# Patient Record
Sex: MALE | Race: WHITE | NOT HISPANIC OR LATINO | Employment: OTHER | URBAN - NONMETROPOLITAN AREA
[De-identification: names, ages, dates, MRNs, and addresses within clinical notes are randomized per-mention and may not be internally consistent; named-entity substitution may affect disease eponyms.]

---

## 2023-10-18 ENCOUNTER — OFFICE VISIT (OUTPATIENT)
Dept: BEHAVIORAL HEALTH | Facility: CLINIC | Age: 23
End: 2023-10-18
Payer: COMMERCIAL

## 2023-10-18 VITALS
OXYGEN SATURATION: 96 % | WEIGHT: 154 LBS | HEIGHT: 76 IN | HEART RATE: 74 BPM | BODY MASS INDEX: 18.75 KG/M2 | SYSTOLIC BLOOD PRESSURE: 120 MMHG | DIASTOLIC BLOOD PRESSURE: 64 MMHG

## 2023-10-18 DIAGNOSIS — F43.10 POST TRAUMATIC STRESS DISORDER (PTSD): ICD-10-CM

## 2023-10-18 DIAGNOSIS — F33.3 SEVERE EPISODE OF RECURRENT MAJOR DEPRESSIVE DISORDER, WITH PSYCHOTIC FEATURES: Primary | ICD-10-CM

## 2023-10-18 DIAGNOSIS — F81.9 LEARNING DISABILITY: ICD-10-CM

## 2023-10-18 DIAGNOSIS — R62.50 DEVELOPMENTAL DELAY: ICD-10-CM

## 2023-10-18 DIAGNOSIS — G47.20 CIRCADIAN RHYTHM SLEEP DISORDER, UNSPECIFIED TYPE: ICD-10-CM

## 2023-10-18 DIAGNOSIS — F41.1 GENERALIZED ANXIETY DISORDER: ICD-10-CM

## 2023-10-18 RX ORDER — HYDROXYZINE PAMOATE 25 MG/1
25 CAPSULE ORAL 2 TIMES DAILY PRN
Qty: 60 CAPSULE | Refills: 0 | Status: SHIPPED | OUTPATIENT
Start: 2023-10-18

## 2023-10-18 RX ORDER — VENLAFAXINE HYDROCHLORIDE 37.5 MG/1
CAPSULE, EXTENDED RELEASE ORAL
Qty: 60 CAPSULE | Refills: 0 | Status: SHIPPED | OUTPATIENT
Start: 2023-10-18
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
## 2023-10-31 ENCOUNTER — TELEPHONE (OUTPATIENT)
Dept: BEHAVIORAL HEALTH | Facility: CLINIC | Age: 23
End: 2023-10-31
Payer: COMMERCIAL

## 2023-10-31 NOTE — TELEPHONE ENCOUNTER
PATIENTS MOM CALLED IN REGARDS TO A LETTER SHE SAID YOU WAS GOING TO TYPE OUT FOR HER, STATING THAT THEIR DOG, AGUILA, IS HIS EMOTIONAL SUPPORT ANIMAL. SO THAT THEY CAN HAVE THE DOG AT THEIR APARTMENT.

## 2023-10-31 NOTE — TELEPHONE ENCOUNTER
Follow up with PCP for long term management    Take diclofenac twice a day as needed for pain and inflammation    You may utilize baclofen to return today as needed for neck stiffness, this may make you sleepy do not operate machinery or drive taking this medication.    Returns to the emergency department for worsening symptoms   Letter created and routed to you for printing when patient is able to .

## 2023-11-17 DIAGNOSIS — F33.3 SEVERE EPISODE OF RECURRENT MAJOR DEPRESSIVE DISORDER, WITH PSYCHOTIC FEATURES: ICD-10-CM

## 2023-11-17 DIAGNOSIS — F41.1 GENERALIZED ANXIETY DISORDER: ICD-10-CM

## 2023-11-17 RX ORDER — VENLAFAXINE HYDROCHLORIDE 37.5 MG/1
CAPSULE, EXTENDED RELEASE ORAL
Qty: 60 CAPSULE | Refills: 0 | OUTPATIENT
Start: 2023-11-17

## 2023-11-20 ENCOUNTER — TELEPHONE (OUTPATIENT)
Dept: BEHAVIORAL HEALTH | Facility: CLINIC | Age: 23
End: 2023-11-20

## 2023-11-20 DIAGNOSIS — F33.3 SEVERE EPISODE OF RECURRENT MAJOR DEPRESSIVE DISORDER, WITH PSYCHOTIC FEATURES: ICD-10-CM

## 2023-11-20 DIAGNOSIS — F41.1 GENERALIZED ANXIETY DISORDER: ICD-10-CM

## 2023-11-20 DIAGNOSIS — G47.20 CIRCADIAN RHYTHM SLEEP DISORDER, UNSPECIFIED TYPE: ICD-10-CM

## 2023-11-20 NOTE — TELEPHONE ENCOUNTER
Incoming Refill Request      Medication requested (name and dose):     HYDROXYZINE PAMOATE (VISTARIL) 25 MG CAPSULE     VENLAFAXINE XR (EFFEXOR-XR) 37.5 MG CAPSULE     Pharmacy where request should be sent:     [facility]     Additional details provided by patient:     PATIENT HAS BEEN RESCHEDULED TO 01/16/24 DUE TO PROVIDER BEING OUT OF THE OFFICE     Best call back number: [phone]    Does the patient have less than a 3 day supply:  [] Yes  [] No    Deacon Ricci Rep  11/20/23, 08:55 EST        {Tip  DIRECTIONS Send the encounter HIGH priority, If patient has less than a 3 day supply. If the patient will run out of medication over the weekend add that information to the additional details line. Send this encounter to the clinical pool:[id number]

## 2023-11-21 RX ORDER — HYDROXYZINE PAMOATE 25 MG/1
25 CAPSULE ORAL 2 TIMES DAILY PRN
Qty: 60 CAPSULE | Refills: 1 | Status: SHIPPED | OUTPATIENT
Start: 2023-11-21

## 2023-11-21 RX ORDER — VENLAFAXINE HYDROCHLORIDE 37.5 MG/1
CAPSULE, EXTENDED RELEASE ORAL
Qty: 60 CAPSULE | Refills: 1 | Status: SHIPPED | OUTPATIENT
Start: 2023-11-21

## 2024-01-16 ENCOUNTER — OFFICE VISIT (OUTPATIENT)
Dept: BEHAVIORAL HEALTH | Facility: CLINIC | Age: 24
End: 2024-01-16
Payer: COMMERCIAL

## 2024-01-16 VITALS
WEIGHT: 163 LBS | OXYGEN SATURATION: 97 % | HEIGHT: 76 IN | BODY MASS INDEX: 19.85 KG/M2 | SYSTOLIC BLOOD PRESSURE: 110 MMHG | DIASTOLIC BLOOD PRESSURE: 70 MMHG | HEART RATE: 78 BPM

## 2024-01-16 DIAGNOSIS — G47.20 CIRCADIAN RHYTHM SLEEP DISORDER, UNSPECIFIED TYPE: ICD-10-CM

## 2024-01-16 DIAGNOSIS — F81.9 LEARNING DISABILITY: ICD-10-CM

## 2024-01-16 DIAGNOSIS — F43.10 POST TRAUMATIC STRESS DISORDER (PTSD): ICD-10-CM

## 2024-01-16 DIAGNOSIS — R62.50 DEVELOPMENTAL DELAY: ICD-10-CM

## 2024-01-16 DIAGNOSIS — F25.1 SCHIZOAFFECTIVE DISORDER, DEPRESSIVE TYPE: Primary | ICD-10-CM

## 2024-01-16 DIAGNOSIS — F41.1 GENERALIZED ANXIETY DISORDER: ICD-10-CM

## 2024-01-16 RX ORDER — ZIPRASIDONE HYDROCHLORIDE 20 MG/1
CAPSULE ORAL
Qty: 30 CAPSULE | Refills: 2 | Status: SHIPPED | OUTPATIENT
Start: 2024-01-16

## 2024-01-16 RX ORDER — HYDROXYZINE PAMOATE 50 MG/1
CAPSULE ORAL
Qty: 30 CAPSULE | Refills: 2 | Status: SHIPPED | OUTPATIENT
Start: 2024-01-16

## 2024-01-16 RX ORDER — VENLAFAXINE HYDROCHLORIDE 75 MG/1
75 CAPSULE, EXTENDED RELEASE ORAL DAILY
Qty: 30 CAPSULE | Refills: 2 | Status: SHIPPED | OUTPATIENT
Start: 2024-01-16
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
## 2024-02-25 DIAGNOSIS — F41.1 GENERALIZED ANXIETY DISORDER: ICD-10-CM

## 2024-02-25 DIAGNOSIS — G47.20 CIRCADIAN RHYTHM SLEEP DISORDER, UNSPECIFIED TYPE: ICD-10-CM

## 2024-02-26 RX ORDER — HYDROXYZINE PAMOATE 50 MG/1
CAPSULE ORAL
Qty: 30 CAPSULE | Refills: 2 | OUTPATIENT
Start: 2024-02-26

## 2024-03-29 ENCOUNTER — TELEPHONE (OUTPATIENT)
Dept: FAMILY MEDICINE CLINIC | Facility: CLINIC | Age: 24
End: 2024-03-29

## 2024-03-29 NOTE — TELEPHONE ENCOUNTER
HUB TO RELAY    TIRED TO LEAVE VM, BUT VM BOX IS NOT SET UP. PT PROVIDER IS OUT OF OFFICE TODAY AND WE ARE NEEDING TO GET PT APPT RESCHEDULED.

## 2024-04-15 ENCOUNTER — TELEPHONE (OUTPATIENT)
Dept: BEHAVIORAL HEALTH | Facility: CLINIC | Age: 24
End: 2024-04-15
Payer: COMMERCIAL

## 2024-04-15 NOTE — TELEPHONE ENCOUNTER
COURTESY CALL TO RESCHEDULE NO SHOW APPT.     VOICEMAIL NOT SET UP, COULD NOT LEAVE MESSAGE    CALLED ALTERNATE CONTACT NUMBER, NO LONGER IN SERVICE

## 2024-04-18 ENCOUNTER — OFFICE VISIT (OUTPATIENT)
Dept: BEHAVIORAL HEALTH | Facility: CLINIC | Age: 24
End: 2024-04-18
Payer: COMMERCIAL

## 2024-04-18 VITALS
HEART RATE: 98 BPM | OXYGEN SATURATION: 98 % | HEIGHT: 76 IN | DIASTOLIC BLOOD PRESSURE: 84 MMHG | BODY MASS INDEX: 20.95 KG/M2 | SYSTOLIC BLOOD PRESSURE: 120 MMHG | WEIGHT: 172 LBS

## 2024-04-18 DIAGNOSIS — F25.1 SCHIZOAFFECTIVE DISORDER, DEPRESSIVE TYPE: ICD-10-CM

## 2024-04-18 DIAGNOSIS — F43.10 POST TRAUMATIC STRESS DISORDER (PTSD): ICD-10-CM

## 2024-04-18 DIAGNOSIS — F41.1 GENERALIZED ANXIETY DISORDER: ICD-10-CM

## 2024-04-18 DIAGNOSIS — G47.20 CIRCADIAN RHYTHM SLEEP DISORDER, UNSPECIFIED TYPE: Primary | ICD-10-CM

## 2024-04-18 DIAGNOSIS — F81.9 LEARNING DISABILITY: ICD-10-CM

## 2024-04-18 DIAGNOSIS — R62.50 DEVELOPMENTAL DELAY: ICD-10-CM

## 2024-04-18 RX ORDER — VENLAFAXINE HYDROCHLORIDE 75 MG/1
75 CAPSULE, EXTENDED RELEASE ORAL DAILY
Qty: 30 CAPSULE | Refills: 2 | Status: SHIPPED | OUTPATIENT
Start: 2024-04-18

## 2024-04-18 RX ORDER — MIRTAZAPINE 7.5 MG/1
7.5 TABLET, FILM COATED ORAL NIGHTLY
Qty: 30 TABLET | Refills: 2 | Status: SHIPPED | OUTPATIENT
Start: 2024-04-18

## 2024-04-18 RX ORDER — ZIPRASIDONE HYDROCHLORIDE 40 MG/1
CAPSULE ORAL
Qty: 30 CAPSULE | Refills: 2 | Status: SHIPPED | OUTPATIENT
Start: 2024-04-18

## 2024-04-18 RX ORDER — HYDROXYZINE PAMOATE 50 MG/1
CAPSULE ORAL
Qty: 60 CAPSULE | Refills: 2 | Status: SHIPPED | OUTPATIENT
Start: 2024-04-18

## 2024-04-18 NOTE — PROGRESS NOTES
Follow Up Office Visit      Patient Name: Chiki Capone  : 2000   MRN: [id number]     Referring Provider: Timoteo Dubon MD    Chief Complaint:      ICD-10-CM ICD-9-CM   1. Circadian rhythm sleep disorder, unspecified type  G47.20 327.30   2. Generalized anxiety disorder  F41.1 300.02   3. Schizoaffective disorder, depressive type  F25.1 295.70   4. Post traumatic stress disorder (PTSD)  F43.10 309.81   5. Developmental delay  R62.50 783.40   6. Learning disability  F81.9 315.2        History of Present Illness:   Chiki Capone is a 24 y.o. male who is here today with his mom for follow up with psychiatric medications.    Subjective      Patient Reports: insomnia is worse.  Mood and depression are good.  I shut off everything, all my devices, but my mind wont shut off so I can sleep.  I can go several days with barely any sleep and its awful.  Its making my anxiety high    Review of Systems:   Review of Systems   Psychiatric/Behavioral:  Positive for sleep disturbance. The patient is nervous/anxious.        Screening Scores:   PHQ-9 : 3  SHIRLEY-7 : 21    RISK ASSESSMENT:  Patient denies any high risk factors today.    Medications:     Current Outpatient Medications:     hydrOXYzine pamoate (VISTARIL) 50 MG capsule, Take 1-2 capsules by mouth daily as needed for anxiety., Disp: 60 capsule, Rfl: 2    venlafaxine XR (Effexor XR) 75 MG 24 hr capsule, Take 1 capsule by mouth Daily., Disp: 30 capsule, Rfl: 2    mirtazapine (REMERON) 7.5 MG tablet, Take 1 tablet by mouth Every Night., Disp: 30 tablet, Rfl: 2    ziprasidone (GEODON) 40 MG capsule, Take 1 capsule by mouth at night with a meal., Disp: 30 capsule, Rfl: 2    Medication Considerations:  SHERMAN reviewed and appropriate.      Allergies:   Allergies   Allergen Reactions    Sulfa Antibiotics Rash       Results Reviewed:   screeners     Objective     Physical Exam:  Vital Signs:   Vitals:    24 0857   BP: 120/84   Pulse: 98   SpO2: 98%   Weight:  "78 kg (172 lb)   Height: 193 cm (76\")     Body mass index is 20.94 kg/m².     Mental Status Exam:   Hygiene:   fair  Cooperation:  Cooperative  Eye Contact:  Fair  Psychomotor Behavior:  Appropriate  Affect:  Blunted  Mood: anxious  Speech:  Normal  Thought Process:  Linear  Thought Content:  Mood congruent  Suicidal:  None  Homicidal:  None  Hallucinations:  None  Delusion:  None  Memory:  Intact  Orientation:  Grossly intact  Reliability:  fair  Insight:  Fair  Judgement:  Poor  Impulse Control:  Poor  Physical/Medical Issues:   nothing reported today      Assessment / Plan      Visit Diagnosis/Orders Placed This Visit:  Diagnoses and all orders for this visit:    1. Circadian rhythm sleep disorder, unspecified type (Primary)  -     hydrOXYzine pamoate (VISTARIL) 50 MG capsule; Take 1-2 capsules by mouth daily as needed for anxiety.  Dispense: 60 capsule; Refill: 2  -     mirtazapine (REMERON) 7.5 MG tablet; Take 1 tablet by mouth Every Night.  Dispense: 30 tablet; Refill: 2    2. Generalized anxiety disorder  -     venlafaxine XR (Effexor XR) 75 MG 24 hr capsule; Take 1 capsule by mouth Daily.  Dispense: 30 capsule; Refill: 2  -     hydrOXYzine pamoate (VISTARIL) 50 MG capsule; Take 1-2 capsules by mouth daily as needed for anxiety.  Dispense: 60 capsule; Refill: 2  -     mirtazapine (REMERON) 7.5 MG tablet; Take 1 tablet by mouth Every Night.  Dispense: 30 tablet; Refill: 2    3. Schizoaffective disorder, depressive type  -     ziprasidone (GEODON) 40 MG capsule; Take 1 capsule by mouth at night with a meal.  Dispense: 30 capsule; Refill: 2    4. Post traumatic stress disorder (PTSD)    5. Developmental delay    6. Learning disability         Functional Status: Moderate impairment     Prognosis: Guarded with Ongoing Treatment    Impression/Formulation:  Patient appeared alert and oriented. Patient is receptive to assistance with maintaining a stable lifestyle.  Patient presents with history of     ICD-10-CM " ICD-9-CM   1. Circadian rhythm sleep disorder, unspecified type  G47.20 327.30   2. Generalized anxiety disorder  F41.1 300.02   3. Schizoaffective disorder, depressive type  F25.1 295.70   4. Post traumatic stress disorder (PTSD)  F43.10 309.81   5. Developmental delay  R62.50 783.40   6. Learning disability  F81.9 315.2   .     Treatment Plan:   Increase ziprasidone  Continue hydroxyzine prn, effexor xr  Start mirtazapine for sleep    Patient will continue supportive psychotherapy efforts and medications as indicated. Clinic will obtain release of information for current treatment team for continuity of care as needed. Patient will contact this office, call 911 or present to the nearest emergency room should suicidal or homicidal ideations occur.  Discussed medication options and treatment plan of prescribed medication(s) as well as the risks, benefits, and potential side effects. Patient ackowledged and verbally consented to continue with current treatment plan and was educated on the importance of compliance with treatment and follow-up appointments.     Follow Up:   Return in about 3 months (around 7/18/2024) for Med Check.        ADRIANO Fernando, KIKA-BC  [facility]     This is electronically signed by ADRINAO Fernando, DORIE  [unfilled] 09:33 EDT

## 2024-05-05 DIAGNOSIS — F25.1 SCHIZOAFFECTIVE DISORDER, DEPRESSIVE TYPE: ICD-10-CM

## 2024-05-05 DIAGNOSIS — F41.1 GENERALIZED ANXIETY DISORDER: ICD-10-CM

## 2024-05-06 RX ORDER — VENLAFAXINE HYDROCHLORIDE 75 MG/1
75 CAPSULE, EXTENDED RELEASE ORAL DAILY
Qty: 30 CAPSULE | Refills: 2 | OUTPATIENT
Start: 2024-05-06

## 2024-05-06 RX ORDER — ZIPRASIDONE HYDROCHLORIDE 20 MG/1
CAPSULE ORAL
Qty: 30 CAPSULE | Refills: 2 | OUTPATIENT
Start: 2024-05-06

## 2024-07-17 ENCOUNTER — OFFICE VISIT (OUTPATIENT)
Dept: BEHAVIORAL HEALTH | Facility: CLINIC | Age: 24
End: 2024-07-17
Payer: COMMERCIAL

## 2024-07-17 VITALS
BODY MASS INDEX: 20.34 KG/M2 | OXYGEN SATURATION: 99 % | SYSTOLIC BLOOD PRESSURE: 124 MMHG | HEIGHT: 76 IN | DIASTOLIC BLOOD PRESSURE: 70 MMHG | WEIGHT: 167 LBS | HEART RATE: 71 BPM

## 2024-07-17 DIAGNOSIS — G47.20 CIRCADIAN RHYTHM SLEEP DISORDER, UNSPECIFIED TYPE: ICD-10-CM

## 2024-07-17 DIAGNOSIS — F41.1 GENERALIZED ANXIETY DISORDER: Primary | ICD-10-CM

## 2024-07-17 DIAGNOSIS — F43.10 POST TRAUMATIC STRESS DISORDER (PTSD): ICD-10-CM

## 2024-07-17 DIAGNOSIS — R62.50 DEVELOPMENTAL DELAY: ICD-10-CM

## 2024-07-17 DIAGNOSIS — F25.1 SCHIZOAFFECTIVE DISORDER, DEPRESSIVE TYPE: ICD-10-CM

## 2024-07-17 DIAGNOSIS — F81.9 LEARNING DISABILITY: ICD-10-CM

## 2024-07-17 RX ORDER — MIRTAZAPINE 15 MG/1
15 TABLET, FILM COATED ORAL NIGHTLY
Qty: 30 TABLET | Refills: 3 | Status: SHIPPED | OUTPATIENT
Start: 2024-07-17

## 2024-07-17 RX ORDER — ZIPRASIDONE HYDROCHLORIDE 40 MG/1
CAPSULE ORAL
Qty: 30 CAPSULE | Refills: 3 | Status: SHIPPED | OUTPATIENT
Start: 2024-07-17

## 2024-07-17 RX ORDER — VENLAFAXINE HYDROCHLORIDE 75 MG/1
75 CAPSULE, EXTENDED RELEASE ORAL DAILY
Qty: 30 CAPSULE | Refills: 3 | Status: SHIPPED | OUTPATIENT
Start: 2024-07-17

## 2024-07-17 RX ORDER — HYDROXYZINE PAMOATE 50 MG/1
CAPSULE ORAL
Qty: 60 CAPSULE | Refills: 3 | Status: SHIPPED | OUTPATIENT
Start: 2024-07-17

## 2024-07-17 NOTE — PROGRESS NOTES
"     Follow Up Office Visit      Patient Name: Chiki Capone  : 2000   MRN: [id number]     Referring Provider: Timoteo Dubon MD    Chief Complaint:      ICD-10-CM ICD-9-CM   1. Generalized anxiety disorder  F41.1 300.02   2. Schizoaffective disorder, depressive type  F25.1 295.70   3. Circadian rhythm sleep disorder, unspecified type  G47.20 327.30   4. Post traumatic stress disorder (PTSD)  F43.10 309.81   5. Developmental delay  R62.50 783.40   6. Learning disability  F81.9 315.2        History of Present Illness:   Chiki Capone is a 24 y.o. male who is here today with his mom for follow up with psychiatric medications    Subjective      Patient Reports:   Feeling a little down, not sleeping at night.  Appetite is good.  Anxiety is still bad    He worries all the time per mom.    Review of Systems:   Review of Systems   Psychiatric/Behavioral:  Positive for sleep disturbance. The patient is nervous/anxious.        Screening Scores:   PHQ-9 : 18  SHIRLEY-7 : 21    RISK ASSESSMENT:  Patient denies any high risk factors today.    Medications:     Current Outpatient Medications:     hydrOXYzine pamoate (VISTARIL) 50 MG capsule, Take 2-3 capsules by mouth daily as needed for anxiety., Disp: 60 capsule, Rfl: 3    venlafaxine XR (Effexor XR) 75 MG 24 hr capsule, Take 1 capsule by mouth Daily., Disp: 30 capsule, Rfl: 3    ziprasidone (GEODON) 40 MG capsule, Take 1 capsule by mouth at night with a meal., Disp: 30 capsule, Rfl: 3    mirtazapine (REMERON) 15 MG tablet, Take 1 tablet by mouth Every Night., Disp: 30 tablet, Rfl: 3    Medication Considerations:  SHERMAN reviewed and appropriate.      Allergies:   Allergies   Allergen Reactions    Sulfa Antibiotics Rash       Results Reviewed:   screeners     Objective     Physical Exam:  Vital Signs:   Vitals:    24 0918   BP: 124/70   Pulse: 71   SpO2: 99%   Weight: 75.8 kg (167 lb)   Height: 193 cm (76\")     Body mass index is 20.33 kg/m².     Mental Status " Exam:   Hygiene:   fair  Cooperation:  Cooperative  Eye Contact:  Fair  Psychomotor Behavior:  Appropriate  Affect:  Blunted  Mood: depressed and anxious  Speech:  Minimal  Thought Process:  Linear  Thought Content:  Mood congruent  Suicidal:  None  Homicidal:  None  Hallucinations:  None  Delusion:  None  Memory:  Intact  Orientation:  Grossly intact  Reliability:  fair  Insight:  Fair  Judgement:  Poor  Impulse Control:  Poor  Physical/Medical Issues:   nothing reported today      Assessment / Plan      Visit Diagnosis/Orders Placed This Visit:  Diagnoses and all orders for this visit:    1. Generalized anxiety disorder (Primary)  -     venlafaxine XR (Effexor XR) 75 MG 24 hr capsule; Take 1 capsule by mouth Daily.  Dispense: 30 capsule; Refill: 3  -     hydrOXYzine pamoate (VISTARIL) 50 MG capsule; Take 2-3 capsules by mouth daily as needed for anxiety.  Dispense: 60 capsule; Refill: 3  -     mirtazapine (REMERON) 15 MG tablet; Take 1 tablet by mouth Every Night.  Dispense: 30 tablet; Refill: 3    2. Schizoaffective disorder, depressive type  -     venlafaxine XR (Effexor XR) 75 MG 24 hr capsule; Take 1 capsule by mouth Daily.  Dispense: 30 capsule; Refill: 3  -     ziprasidone (GEODON) 40 MG capsule; Take 1 capsule by mouth at night with a meal.  Dispense: 30 capsule; Refill: 3  -     mirtazapine (REMERON) 15 MG tablet; Take 1 tablet by mouth Every Night.  Dispense: 30 tablet; Refill: 3    3. Circadian rhythm sleep disorder, unspecified type  -     hydrOXYzine pamoate (VISTARIL) 50 MG capsule; Take 2-3 capsules by mouth daily as needed for anxiety.  Dispense: 60 capsule; Refill: 3  -     mirtazapine (REMERON) 15 MG tablet; Take 1 tablet by mouth Every Night.  Dispense: 30 tablet; Refill: 3    4. Post traumatic stress disorder (PTSD)    5. Developmental delay    6. Learning disability         Functional Status: Moderate impairment     Prognosis: Guarded with Ongoing Treatment    Impression/Formulation:  Patient  appeared alert and oriented. Patient is receptive to assistance with maintaining a stable lifestyle.  Patient presents with history of     ICD-10-CM ICD-9-CM   1. Generalized anxiety disorder  F41.1 300.02   2. Schizoaffective disorder, depressive type  F25.1 295.70   3. Circadian rhythm sleep disorder, unspecified type  G47.20 327.30   4. Post traumatic stress disorder (PTSD)  F43.10 309.81   5. Developmental delay  R62.50 783.40   6. Learning disability  F81.9 315.2   Patient screened positive for depression based on a PHQ-9 score of 18 on 7/17/2024. Follow-up recommendations include:  adjust current medicaitons .  Patient continues to struggle with sleep, anxiety and low energy/motivation.    Treatment Plan:   Increase mirtazapine  Increase vistaril  Continue effexor xr, ziprasidone    Patient will continue supportive psychotherapy efforts and medications as indicated. Clinic will obtain release of information for current treatment team for continuity of care as needed. Patient will contact this office, call 911 or present to the nearest emergency room should suicidal or homicidal ideations occur.  Discussed medication options and treatment plan of prescribed medication(s) as well as the risks, benefits, and potential side effects. Patient ackowledged and verbally consented to continue with current treatment plan and was educated on the importance of compliance with treatment and follow-up appointments.     Follow Up:   Return in about 4 months (around 11/17/2024) for Med Check.        ADRIANO Fernando, DORIE  [facility]     This is electronically signed by ADRIANO Fernando, DORIE  [unfilled] 15:43 EDT

## 2024-08-02 DIAGNOSIS — G47.20 CIRCADIAN RHYTHM SLEEP DISORDER, UNSPECIFIED TYPE: ICD-10-CM

## 2024-08-02 DIAGNOSIS — F41.1 GENERALIZED ANXIETY DISORDER: ICD-10-CM

## 2024-08-02 RX ORDER — MIRTAZAPINE 7.5 MG/1
7.5 TABLET, FILM COATED ORAL NIGHTLY
Qty: 30 TABLET | Refills: 2 | OUTPATIENT
Start: 2024-08-02

## 2024-11-27 DIAGNOSIS — F25.1 SCHIZOAFFECTIVE DISORDER, DEPRESSIVE TYPE: ICD-10-CM

## 2024-12-02 ENCOUNTER — TELEPHONE (OUTPATIENT)
Dept: BEHAVIORAL HEALTH | Facility: CLINIC | Age: 24
End: 2024-12-02
Payer: COMMERCIAL

## 2024-12-02 RX ORDER — ZIPRASIDONE HYDROCHLORIDE 40 MG/1
CAPSULE ORAL
Qty: 30 CAPSULE | Refills: 1 | Status: SHIPPED | OUTPATIENT
Start: 2024-12-02

## 2024-12-02 NOTE — TELEPHONE ENCOUNTER
CALLED TO SCHEDULE MED CHECK APPT WITH ARSH ELMORE, IN ORDER TO CONTINUE REFILLS.       UNABLE TO LVM, RECORDING SAYS UNABLE TO RECEIVE CALLS AT THIS TIME

## 2024-12-02 NOTE — TELEPHONE ENCOUNTER
Rx Refill Note    Requested Prescriptions     Pending Prescriptions Disp Refills    ziprasidone (GEODON) 40 MG capsule [Pharmacy Med Name: ZIPRASIDONE HCL 40MG CAPSULES] 30 capsule 0     Sig: TAKE 1 CAPSULE BY MOUTH AT NIGHT WITH A MEAL        Last office visit with prescribing clinician: 7/17/2024      Next office visit with prescribing clinician: Visit date not found   Last labs:  Last refill: 07/17/2024   Pharmacy (be sure to add in Epic). correct

## 2024-12-09 ENCOUNTER — TELEPHONE (OUTPATIENT)
Dept: BEHAVIORAL HEALTH | Facility: CLINIC | Age: 24
End: 2024-12-09

## 2024-12-09 NOTE — TELEPHONE ENCOUNTER
CALLED TO SCHEDULE MED CHECK WITH ARSH ELMORE IN ORDER TO CONTINUE REFILLS.       PHONE JUST RINGS, RECORDING SAYS CALL CAN NOT BE COMPLETED AT THIS TIME.     UNABLE TO LEAVE MESSAGE

## 2025-03-20 ENCOUNTER — OFFICE VISIT (OUTPATIENT)
Dept: BEHAVIORAL HEALTH | Facility: CLINIC | Age: 25
End: 2025-03-20
Payer: COMMERCIAL

## 2025-03-20 VITALS
BODY MASS INDEX: 19.97 KG/M2 | DIASTOLIC BLOOD PRESSURE: 78 MMHG | HEIGHT: 76 IN | OXYGEN SATURATION: 98 % | SYSTOLIC BLOOD PRESSURE: 120 MMHG | HEART RATE: 78 BPM | WEIGHT: 164 LBS

## 2025-03-20 DIAGNOSIS — F43.10 POST TRAUMATIC STRESS DISORDER (PTSD): ICD-10-CM

## 2025-03-20 DIAGNOSIS — R62.50 DEVELOPMENTAL DELAY: ICD-10-CM

## 2025-03-20 DIAGNOSIS — F81.9 LEARNING DISABILITY: ICD-10-CM

## 2025-03-20 DIAGNOSIS — F41.1 GENERALIZED ANXIETY DISORDER: ICD-10-CM

## 2025-03-20 DIAGNOSIS — F25.1 SCHIZOAFFECTIVE DISORDER, DEPRESSIVE TYPE: Primary | ICD-10-CM

## 2025-03-20 DIAGNOSIS — G47.20 CIRCADIAN RHYTHM SLEEP DISORDER, UNSPECIFIED TYPE: ICD-10-CM

## 2025-03-20 RX ORDER — MIRTAZAPINE 15 MG/1
TABLET, FILM COATED ORAL
Qty: 60 TABLET | Refills: 5 | Status: SHIPPED | OUTPATIENT
Start: 2025-03-20 | End: 2025-03-21

## 2025-03-20 RX ORDER — HYDROXYZINE PAMOATE 50 MG/1
CAPSULE ORAL
Qty: 60 CAPSULE | Refills: 3 | Status: SHIPPED | OUTPATIENT
Start: 2025-03-20

## 2025-03-20 RX ORDER — ZIPRASIDONE HYDROCHLORIDE 20 MG/1
CAPSULE ORAL
Qty: 60 CAPSULE | Refills: 5 | Status: SHIPPED | OUTPATIENT
Start: 2025-03-20 | End: 2025-03-21

## 2025-03-20 NOTE — PROGRESS NOTES
Follow Up Office Visit      Patient Name: Chiki Capone  : 2000   MRN: [id number]     Referring Provider: Timoteo Dubon MD    Chief Complaint:      ICD-10-CM ICD-9-CM   1. Schizoaffective disorder, depressive type  F25.1 295.70   2. Generalized anxiety disorder  F41.1 300.02   3. Circadian rhythm sleep disorder, unspecified type  G47.20 327.30   4. Post traumatic stress disorder (PTSD)  F43.10 309.81   5. Developmental delay  R62.50 783.40   6. Learning disability  F81.9 315.2        History of Present Illness:   Chiki Capone is a 25 y.o. male who is here today with his mom for follow up with psychiatric medications.    Subjective      Patient Reports:  Anxiety is up, worry about a lot of things all the time.  Not sleeping good without the medicine at night.      Mom reports: he has not been doing well without his medicine but we had trouble with insurance and getting in here so now we need to get back on all his medications.  He has some hydroxyzine left but that's pretty much all.  He stays at home all the time.  All he wants to do is sleep and lay around on his phone.    Review of Systems:   Review of Systems   Psychiatric/Behavioral:  Positive for sleep disturbance and depressed mood. The patient is nervous/anxious.        Screening Scores:   PHQ-9 : 9  SHIRLEY-7 : 7    RISK ASSESSMENT:  Patient denies any high risk factors today.    Medications:     Current Outpatient Medications:     hydrOXYzine pamoate (VISTARIL) 50 MG capsule, Take 2-3 capsules by mouth daily as needed for anxiety., Disp: 60 capsule, Rfl: 3    mirtazapine (REMERON) 15 MG tablet, Take 1 tablet by mouth every night at bedtime on days 1-7, then take 2 tablets every night starting on day 8., Disp: 60 tablet, Rfl: 5    ziprasidone (GEODON) 20 MG capsule, Take 1 capsule by mouth each morning with breakfast, and 1 capsule at night with dinner., Disp: 60 capsule, Rfl: 5    Medication Considerations:  SHERMAN reviewed and appropriate.  "     Allergies:   Allergies   Allergen Reactions    Sulfa Antibiotics Rash       Results Reviewed:   screeners     Objective     Physical Exam:  Vital Signs:   Vitals:    03/20/25 1431   BP: 120/78   Pulse: 78   SpO2: 98%   Weight: 74.4 kg (164 lb)   Height: 193 cm (76\")     Body mass index is 19.96 kg/m².     Mental Status Exam:   Hygiene:   poor  Cooperation:  Cooperative  Eye Contact:  Downcast  Psychomotor Behavior:   fidgeting  Affect:  Blunted  Mood: depressed and anxious  Speech:  Minimal  Thought Process:  Linear  Thought Content:  Mood congruent  Suicidal:  None  Homicidal:  None  Hallucinations:  None  Delusion:  None  Memory:  Intact  Orientation:  Grossly intact  Reliability:  fair  Insight:  Fair  Judgement:  Poor  Impulse Control:  Poor  Physical/Medical Issues:   nothing reported today      Assessment / Plan      Visit Diagnosis/Orders Placed This Visit:  Diagnoses and all orders for this visit:    1. Schizoaffective disorder, depressive type (Primary)  -     mirtazapine (REMERON) 15 MG tablet; Take 1 tablet by mouth every night at bedtime on days 1-7, then take 2 tablets every night starting on day 8.  Dispense: 60 tablet; Refill: 5  -     ziprasidone (GEODON) 20 MG capsule; Take 1 capsule by mouth each morning with breakfast, and 1 capsule at night with dinner.  Dispense: 60 capsule; Refill: 5    2. Generalized anxiety disorder  -     hydrOXYzine pamoate (VISTARIL) 50 MG capsule; Take 2-3 capsules by mouth daily as needed for anxiety.  Dispense: 60 capsule; Refill: 3  -     mirtazapine (REMERON) 15 MG tablet; Take 1 tablet by mouth every night at bedtime on days 1-7, then take 2 tablets every night starting on day 8.  Dispense: 60 tablet; Refill: 5    3. Circadian rhythm sleep disorder, unspecified type  -     hydrOXYzine pamoate (VISTARIL) 50 MG capsule; Take 2-3 capsules by mouth daily as needed for anxiety.  Dispense: 60 capsule; Refill: 3  -     mirtazapine (REMERON) 15 MG tablet; Take 1 tablet " by mouth every night at bedtime on days 1-7, then take 2 tablets every night starting on day 8.  Dispense: 60 tablet; Refill: 5    4. Post traumatic stress disorder (PTSD)    5. Developmental delay    6. Learning disability         Functional Status: Moderate impairment     Prognosis: Guarded with Ongoing Treatment    Impression/Formulation:  Patient appeared alert and oriented. Patient is receptive to assistance with maintaining a stable lifestyle.  Patient presents with history of     ICD-10-CM ICD-9-CM   1. Schizoaffective disorder, depressive type  F25.1 295.70   2. Generalized anxiety disorder  F41.1 300.02   3. Circadian rhythm sleep disorder, unspecified type  G47.20 327.30   4. Post traumatic stress disorder (PTSD)  F43.10 309.81   5. Developmental delay  R62.50 783.40   6. Learning disability  F81.9 315.2   Patient screened positive for depression based on a PHQ-9 score of 9 on 3/20/2025. Follow-up recommendations include: Continue with medication management.  Patient ran out of medications due to missed appointments and insurance changes, transportation issues.      Treatment Plan:   Reinitiate mirtazapine, ziprasidone  Continue hydroxyzine prn    Patient will continue supportive psychotherapy efforts and medications as indicated. Clinic will obtain release of information for current treatment team for continuity of care as needed. Patient will contact this office, call 911 or present to the nearest emergency room should suicidal or homicidal ideations occur.  Discussed medication options and treatment plan of prescribed medication(s) as well as the risks, benefits, and potential side effects. Patient ackowledged and verbally consented to continue with current treatment plan and was educated on the importance of compliance with treatment and follow-up appointments.     Follow Up:   Return in about 6 months (around 9/20/2025) for Med Check.        ADRIANO Fernando, PMHNP-BC [facility]  [place]     This is electronically signed by ADRIANO Fernando, PMHNP-BC  [unfilled] 15:29 EDT

## 2025-03-21 ENCOUNTER — OFFICE VISIT (OUTPATIENT)
Dept: FAMILY MEDICINE CLINIC | Facility: CLINIC | Age: 25
End: 2025-03-21
Payer: COMMERCIAL

## 2025-03-21 VITALS
WEIGHT: 163.1 LBS | SYSTOLIC BLOOD PRESSURE: 130 MMHG | OXYGEN SATURATION: 96 % | DIASTOLIC BLOOD PRESSURE: 88 MMHG | BODY MASS INDEX: 19.86 KG/M2 | HEIGHT: 76 IN | HEART RATE: 69 BPM

## 2025-03-21 DIAGNOSIS — M54.9 CHRONIC BACK PAIN, UNSPECIFIED BACK LOCATION, UNSPECIFIED BACK PAIN LATERALITY: ICD-10-CM

## 2025-03-21 DIAGNOSIS — R03.0 ELEVATED BLOOD PRESSURE READING: Primary | ICD-10-CM

## 2025-03-21 DIAGNOSIS — Z13.220 SCREENING FOR HYPERLIPIDEMIA: ICD-10-CM

## 2025-03-21 DIAGNOSIS — M41.129 ADOLESCENT IDIOPATHIC SCOLIOSIS, UNSPECIFIED SPINAL REGION: ICD-10-CM

## 2025-03-21 DIAGNOSIS — Z13.1 SCREENING FOR DIABETES MELLITUS: ICD-10-CM

## 2025-03-21 DIAGNOSIS — Z11.59 NEED FOR HEPATITIS C SCREENING TEST: ICD-10-CM

## 2025-03-21 DIAGNOSIS — Z00.00 GENERAL MEDICAL EXAM: ICD-10-CM

## 2025-03-21 DIAGNOSIS — G89.29 CHRONIC BACK PAIN, UNSPECIFIED BACK LOCATION, UNSPECIFIED BACK PAIN LATERALITY: ICD-10-CM

## 2025-03-21 DIAGNOSIS — R01.1 HEART MURMUR: ICD-10-CM

## 2025-03-21 RX ORDER — ZIPRASIDONE HYDROCHLORIDE 40 MG/1
40 CAPSULE ORAL
COMMUNITY

## 2025-03-21 RX ORDER — VENLAFAXINE HYDROCHLORIDE 75 MG/1
75 CAPSULE, EXTENDED RELEASE ORAL DAILY
COMMUNITY

## 2025-03-21 NOTE — ASSESSMENT & PLAN NOTE
Refer to cardiology, patient has a marfanoid appearance with a significant systolic murmur.  General labs today and have patient back for physical in 6 weeks.

## 2025-03-21 NOTE — PROGRESS NOTES
Patient Office Visit      Patient Name: Chiki Capone  : 2000   MRN: [id number]     Chief Complaint:    Chief Complaint   Patient presents with    Hypertension    Back Pain       History of Present Illness: Chiki Capone is a 25 y.o. male who is here today to establish care.  He has a history of severe scoliosis status post surgical treatment when he was 16.  His mother says he complains of back pain all the time.  He had a significant growth spurt after age 16 and she thinks the rods may have become displaced.  He also always seems to have high blood pressure when checked.  She says he was born with a heart murmur.  He has not been seen by cardiologist in many years.    Subjective      Review of Systems:         Past Medical History:   Past Medical History:   Diagnosis Date    Heart murmur     Hypertension        Past Surgical History:   Past Surgical History:   Procedure Laterality Date    BACK SURGERY         Family History:   Family History   Problem Relation Age of Onset    Drug abuse Maternal Grandfather     Hypertension Maternal Grandfather     Heart disease Maternal Grandfather     Cancer Maternal Grandfather     Drug abuse Paternal Grandfather     Hypertension Paternal Grandfather     Heart disease Paternal Grandfather     Cancer Paternal Grandfather        Social History:   Social History     Socioeconomic History    Marital status: Single   Tobacco Use    Smoking status: Every Day     Current packs/day: 0.05     Average packs/day: 0.1 packs/day for 5.2 years (0.3 ttl pk-yrs)     Types: Cigarettes     Start date:     Smokeless tobacco: Never    Tobacco comments:     Patient reports smoking one cigarette a day.   Vaping Use    Vaping status: Never Used   Substance and Sexual Activity    Alcohol use: Yes     Comment: socially    Drug use: Never    Sexual activity: Defer       Allergies:   Allergies   Allergen Reactions    Ibuprofen Nausea And Vomiting    Sulfa Antibiotics Rash     "Trimethoprim Rash       Objective     Physical Exam:  Vital Signs:   Vitals:    03/21/25 1009 03/21/25 1048   BP: 140/92 130/88   BP Location: Left arm Left arm   Patient Position: Sitting Sitting   Cuff Size: Adult Adult   Pulse: 69    SpO2: 96%    Weight: 74 kg (163 lb 1.6 oz)    Height: 193 cm (76\")    PainSc: 10-Worst pain ever    PainLoc: Back      Body mass index is 19.85 kg/m².   BMI is within normal parameters. No other follow-up for BMI required.       Physical Exam  Constitutional:       General: He is not in acute distress.  HENT:      Mouth/Throat:      Comments: Very high arched palate.  Cardiovascular:      Rate and Rhythm: Normal rate and regular rhythm.      Heart sounds:      Systolic murmur is present with a grade of 2/6.   Chest:      Chest wall: Deformity (Mild pectus excavatum.  Significant posterior deformity of the ribs due to moderately severe scoliosis.) present.   Musculoskeletal:      Thoracic back: Scoliosis present.      Lumbar back: Scoliosis present.      Comments: Patient with significant scoliosis.   Neurological:      Mental Status: He is alert.   Psychiatric:         Speech: He is noncommunicative.         Behavior: Behavior is cooperative.           ECG 12 Lead    Date/Time: 3/21/2025 11:17 AM  Performed by: Brunilda Willson PA    Authorized by: Brunilda Willson PA  Comparison: not compared with previous ECG   Previous ECG: no previous ECG available  Rhythm: sinus rhythm  Rate: normal  BPM: 67  Conduction: conduction normal  ST Segments: ST segments normal  T Waves: T waves normal  QRS axis: normal  Other findings: left ventricular hypertrophy    Clinical impression: abnormal EKG          Assessment / Plan      Assessment/Plan:   Diagnoses and all orders for this visit:    1. Elevated blood pressure reading (Primary)  Assessment & Plan:  Refer to cardiology, patient has a marfanoid appearance with a significant systolic murmur.  General labs today and have patient back for " physical in 6 weeks.    Orders:  -     Ambulatory Referral to Cardiology  -     ECG 12 Lead    2. Heart murmur  -     Ambulatory Referral to Cardiology  -     ECG 12 Lead    3. Adolescent idiopathic scoliosis, unspecified spinal region  Assessment & Plan:  Scoliosis films and refer back to orthopedics at [facility].    Orders:  -     XR Spine Scoliosis AP Standing; Future  -     Ambulatory Referral to Orthopedic Surgery    4. Chronic back pain, unspecified back location, unspecified back pain laterality  -     Ambulatory Referral to Orthopedic Surgery    5. General medical exam  -     Comprehensive Metabolic Panel  -     Vitamin B12  -     Folate  -     Lipid Panel  -     Hemoglobin A1c  -     TSH  -     T4, Free  -     CBC Auto Differential  -     CK  -     HCV Antibody Rfx To Qnt PCR    6. Screening for hyperlipidemia  -     Lipid Panel    7. Screening for diabetes mellitus  -     Hemoglobin A1c    8. Need for hepatitis C screening test  -     HCV Antibody Rfx To Qnt PCR           Medications:     Current Outpatient Medications:     hydrOXYzine pamoate (VISTARIL) 50 MG capsule, Take 2-3 capsules by mouth daily as needed for anxiety., Disp: 60 capsule, Rfl: 3    venlafaxine XR (EFFEXOR-XR) 75 MG 24 hr capsule, Take 1 capsule by mouth Daily., Disp: , Rfl:     ziprasidone (GEODON) 40 MG capsule, Take 1 capsule by mouth every night at bedtime., Disp: , Rfl:         Follow Up:   Return in about 6 weeks (around 5/2/2025) for Annual physical.    Brunilda Willson PA-C   [facility]     NOTE TO PATIENT: The 21st Century Cures Act makes medical notes like these available to patients in the interest of transparency. However, be advised this is a medical document. It is intended as peer to peer communication. It is written in medical language and may contain abbreviations or verbiage that are unfamiliar. It may appear blunt or direct. Medical documents are intended to carry relevant information, facts  as evident, and the clinical opinion of the practitioner.

## 2025-03-22 LAB
ALBUMIN SERPL-MCNC: 5.1 G/DL (ref 4.3–5.2)
ALP SERPL-CCNC: 49 IU/L (ref 44–121)
ALT SERPL-CCNC: 5 IU/L (ref 0–44)
AST SERPL-CCNC: 13 IU/L (ref 0–40)
BASOPHILS # BLD AUTO: 0.1 X10E3/UL (ref 0–0.2)
BASOPHILS NFR BLD AUTO: 1 %
BILIRUB SERPL-MCNC: 0.7 MG/DL (ref 0–1.2)
BUN SERPL-MCNC: 13 MG/DL (ref 6–20)
BUN/CREAT SERPL: 14 (ref 9–20)
CALCIUM SERPL-MCNC: 9.8 MG/DL (ref 8.7–10.2)
CHLORIDE SERPL-SCNC: 98 MMOL/L (ref 96–106)
CHOLEST SERPL-MCNC: 145 MG/DL (ref 100–199)
CK SERPL-CCNC: 53 U/L (ref 49–439)
CO2 SERPL-SCNC: 25 MMOL/L (ref 20–29)
CREAT SERPL-MCNC: 0.9 MG/DL (ref 0.76–1.27)
EGFRCR SERPLBLD CKD-EPI 2021: 122 ML/MIN/1.73
EOSINOPHIL # BLD AUTO: 0.1 X10E3/UL (ref 0–0.4)
EOSINOPHIL NFR BLD AUTO: 2 %
ERYTHROCYTE [DISTWIDTH] IN BLOOD BY AUTOMATED COUNT: 12.6 % (ref 11.6–15.4)
FOLATE SERPL-MCNC: 8.5 NG/ML
GLOBULIN SER CALC-MCNC: 2.6 G/DL (ref 1.5–4.5)
GLUCOSE SERPL-MCNC: 80 MG/DL (ref 70–99)
HBA1C MFR BLD: 5.2 % (ref 4.8–5.6)
HCT VFR BLD AUTO: 44.2 % (ref 37.5–51)
HCV AB SERPL QL IA: NON REACTIVE
HCV AB SERPL QL IA: NORMAL
HDLC SERPL-MCNC: 50 MG/DL
HGB BLD-MCNC: 14.8 G/DL (ref 13–17.7)
IMM GRANULOCYTES # BLD AUTO: 0 X10E3/UL (ref 0–0.1)
IMM GRANULOCYTES NFR BLD AUTO: 0 %
LDLC SERPL CALC-MCNC: 83 MG/DL (ref 0–99)
LYMPHOCYTES # BLD AUTO: 1.8 X10E3/UL (ref 0.7–3.1)
LYMPHOCYTES NFR BLD AUTO: 34 %
MCH RBC QN AUTO: 29.2 PG (ref 26.6–33)
MCHC RBC AUTO-ENTMCNC: 33.5 G/DL (ref 31.5–35.7)
MCV RBC AUTO: 87 FL (ref 79–97)
MONOCYTES # BLD AUTO: 0.4 X10E3/UL (ref 0.1–0.9)
MONOCYTES NFR BLD AUTO: 8 %
NEUTROPHILS # BLD AUTO: 3 X10E3/UL (ref 1.4–7)
NEUTROPHILS NFR BLD AUTO: 55 %
PLATELET # BLD AUTO: 300 X10E3/UL (ref 150–450)
POTASSIUM SERPL-SCNC: 4.3 MMOL/L (ref 3.5–5.2)
PROT SERPL-MCNC: 7.7 G/DL (ref 6–8.5)
RBC # BLD AUTO: 5.06 X10E6/UL (ref 4.14–5.8)
SODIUM SERPL-SCNC: 138 MMOL/L (ref 134–144)
T4 FREE SERPL-MCNC: 1.24 NG/DL (ref 0.82–1.77)
TRIGL SERPL-MCNC: 56 MG/DL (ref 0–149)
TSH SERPL DL<=0.005 MIU/L-ACNC: 1.21 UIU/ML (ref 0.45–4.5)
VIT B12 SERPL-MCNC: 286 PG/ML (ref 232–1245)
VLDLC SERPL CALC-MCNC: 12 MG/DL (ref 5–40)
WBC # BLD AUTO: 5.4 X10E3/UL (ref 3.4–10.8)

## 2025-04-10 ENCOUNTER — OFFICE VISIT (OUTPATIENT)
Dept: CARDIOLOGY | Facility: CLINIC | Age: 25
End: 2025-04-10
Payer: COMMERCIAL

## 2025-04-10 VITALS
BODY MASS INDEX: 20.46 KG/M2 | HEIGHT: 76 IN | DIASTOLIC BLOOD PRESSURE: 70 MMHG | WEIGHT: 168 LBS | OXYGEN SATURATION: 96 % | HEART RATE: 102 BPM | SYSTOLIC BLOOD PRESSURE: 134 MMHG | RESPIRATION RATE: 18 BRPM

## 2025-04-10 DIAGNOSIS — R01.1 HEART MURMUR: Primary | ICD-10-CM

## 2025-04-10 DIAGNOSIS — R03.0 ELEVATED BLOOD PRESSURE READING: ICD-10-CM

## 2025-04-10 DIAGNOSIS — R29.91 MARFANOID HABITUS: ICD-10-CM

## 2025-04-10 PROCEDURE — 99204 OFFICE O/P NEW MOD 45 MIN: CPT | Performed by: INTERNAL MEDICINE

## 2025-04-10 NOTE — ASSESSMENT & PLAN NOTE
Blood pressure borderline elevated in the office.  Recommended home blood pressure monitoring.  Discussed with mother, due to poor social interaction from patient, with intense focus on his phone, minimal interaction with phone, suggesting Asperger.  Follow-up home blood pressure log in 3 months sooner if reading more than 130/80 on average.

## 2025-04-10 NOTE — PROGRESS NOTES
"Marfan Syndrome Systemic Score-   +1 chest asymmetry   +2 hindfoot  +1 scoliosis (hx prior surgery)  +1 facial features    Height 74\"  Arm span 78\"  Upper segment 37\"  Lower segment 37\"  "

## 2025-04-10 NOTE — PROGRESS NOTES
[facility] [place]  [facility] CARDIOLOGY  [address] [place] [address]  Dept: [phone]  Dept Fax: [phone]    Date: 04/10/2025  Patient: Chiki Capone  YOB: 2000    New Patient Office Note    Consult Reason:  Mr. Chiki Capone is a 25 y.o. male who presents to the clinic to establish care, seen for Heart Murmur.   Patient doing well with no acute complaints. Patient denies  angina,  orthopnea,  PND, palpitations,  lightheadedness,  syncope, medications side-effects.    The following portions of the patient's history were reviewed and updated as appropriate: allergies, current medications, past family history, past medical history, past social history, past surgical history, and problem list.    Medications:   Allergies   Allergen Reactions    Ibuprofen Nausea And Vomiting    Sulfa Antibiotics Rash    Trimethoprim Rash      Current Outpatient Medications   Medication Instructions    hydrOXYzine pamoate (VISTARIL) 50 MG capsule Take 2-3 capsules by mouth daily as needed for anxiety.    venlafaxine XR (EFFEXOR-XR) 75 mg, Daily    ziprasidone (GEODON) 40 mg, Every Night at Bedtime       Subjective  Past Medical History:   Diagnosis Date    Heart murmur     Hypertension        Past Surgical History:   Procedure Laterality Date    BACK SURGERY         Family History   Problem Relation Age of Onset    Drug abuse Maternal Grandfather     Hypertension Maternal Grandfather     Heart disease Maternal Grandfather     Cancer Maternal Grandfather     Drug abuse Paternal Grandfather     Hypertension Paternal Grandfather     Heart disease Paternal Grandfather     Cancer Paternal Grandfather         Social History     Socioeconomic History    Marital status: Single   Tobacco Use    Smoking status: Every Day     Current packs/day: 0.05     Average packs/day: 0.1 packs/day for 5.3 years (0.3 ttl pk-yrs)     Types: Cigarettes     Start date: 2020    Smokeless tobacco: Never     "Tobacco comments:     Patient reports smoking one cigarette a day.   Vaping Use    Vaping status: Never Used   Substance and Sexual Activity    Alcohol use: Yes     Comment: socially    Drug use: Never    Sexual activity: Defer       Objective  Vitals:    04/10/25 1259   BP: 134/70   Pulse: 102   Resp: 18   SpO2: 96%   Weight: 76.2 kg (168 lb)   Height: 193 cm (76\")   PainSc: 0-No pain     Vitals:    04/10/25 1259   BP: 134/70   Pulse: 102   Resp: 18   SpO2: 96%   Weight: 76.2 kg (168 lb)   Height: 193 cm (76\")        Physical Exam  Constitutional:       Appearance: Healthy appearance. Not in distress.   Eyes:      Pupils: Pupils are equal, round, and reactive to light.   HENT:    Mouth/Throat:      Mouth: Mucous membranes are moist.   Neck:      Vascular: No carotid bruit, hepatojugular reflux, JVD or JVR. JVD normal.   Pulmonary:      Effort: Pulmonary effort is normal.      Breath sounds: Normal breath sounds. No wheezing. No rhonchi. No rales.   Chest:      Chest wall: Not tender to palpatation.   Cardiovascular:      PMI at left midclavicular line. Normal rate. Regular rhythm. Normal S1 with normal intensity. Normal S2 with normal intensity.       Murmurs: There is a grade 2/6 harsh midsystolic murmur at the URSB.      No gallop.  No click. No rub.      Comments: Marfanoid features  Pulses:     Intact distal pulses.   Edema:     Peripheral edema absent.   Abdominal:      General: There is no abdominal bruit.   Skin:     General: Skin is warm.   Neurological:      Mental Status: Alert and oriented to person, place and time.              Labs:  Lab Results   Component Value Date     03/21/2025    K 4.3 03/21/2025    CL 98 03/21/2025    CO2 25 03/21/2025    BUN 13 03/21/2025    CREATININE 0.90 03/21/2025    CALCIUM 9.8 03/21/2025    BILITOT 0.7 03/21/2025    ALKPHOS 49 03/21/2025    ALT 5 03/21/2025    AST 13 03/21/2025    GLUCOSE 80 03/21/2025    ALBUMIN 5.1 03/21/2025     Lab Results   Component Value Date " "   WBC 5.4 03/21/2025    HGB 14.8 03/21/2025    HCT 44.2 03/21/2025     03/21/2025     No results found for: \"APTT\", \"INR\", \"PTT\"  Lab Results   Component Value Date    CKTOTAL 53 03/21/2025     No results found for: \"BNP\", \"PROBNP\"    Lab Results   Component Value Date    CHLPL 145 03/21/2025    TRIG 56 03/21/2025    HDL 50 03/21/2025    LDL 83 03/21/2025     Lab Results   Component Value Date    TSH 1.210 03/21/2025    FREET4 1.24 03/21/2025       The ASCVD Risk score (Jose CASTILLO, et al., 2019) failed to calculate for the following reasons:    The 2019 ASCVD risk score is only valid for ages 40 to 79     CV Diagnostics:  EKG reviewed showing normal sinus rhythm with early repolarization.  CXR: No results found for this or any previous visit.     ECHO/MUGA: No results found for this or any previous visit.     STRESS TESTS: No results found for this or any previous visit.     CARDIAC CATH: No results found for this or any previous visit.     DEVICES: No valid procedures specified.   HOLTER: No results found for this or any previous visit.     CT/MRI:  No results found for this or any previous visit.    VASCULAR: No valid procedures specified.     Assessment and Plan  Diagnoses and all orders for this visit:    1. Heart murmur (Primary)  Assessment & Plan:  Suggesting bicuspid aortic valve versus supravalvular stenosis.  Transthoracic echocardiogram for assessment.    Orders:  -     Adult Transthoracic Echo Complete W/ Cont if Necessary Per Protocol; Future    2. Elevated blood pressure reading  Assessment & Plan:  Blood pressure borderline elevated in the office.  Recommended home blood pressure monitoring.  Discussed with mother, due to poor social interaction from patient, with intense focus on his phone, minimal interaction with phone, suggesting Asperger.  Follow-up home blood pressure log in 3 months sooner if reading more than 130/80 on average.       3. Marfanoid habitus  Assessment & Plan:  To get " Jennifer renee at bedside.           Return in about 3 months (around 7/10/2025) for Follow-up with Dr Riley.    Patient Instructions   [facility] [place]  [facility] CARDIOLOGY  [address] [place] [address]  Dept: [phone]  Dept Fax: [phone]    Date:   Patient: Chiki Capone    Blood Pressure Log  Goal blood Pressure <130/80          Provided By: Eliecer Riley MD    Date Blood Pressure Heart Rate Comments   Friday April 11, 2025       Saturday April 12, 2025       William April 13, 2025       Monday April 14, 2025       Tuesday April 15, 2025       Wednesday April 16, 2025       Thursday April 17, 2025       Friday April 18, 2025       Saturday April 19, 2025       William April 20, 2025       Monday April 21, 2025       Tuesday April 22, 2025       Wednesday April 23, 2025       Thursday April 24, 2025       Friday April 25, 2025       Saturday April 26, 2025       William April 27, 2025       Monday April 28, 2025       Tuesday April 29, 2025       Wednesday April 30, 2025       Thursday May 1, 2025       Friday May 2, 2025       Saturday May 3, 2025       William May 4, 2025       Monday May 5, 2025       Tuesday May 6, 2025       Wednesday May 7, 2025       Thursday May 8, 2025       Friday May 9, 2025       Saturday May 10, 2025       William May 11, 2025       Monday May 12, 2025       Tuesday May 13, 2025       Wednesday May 14, 2025       Thursday May 15, 2025       Friday May 16, 2025       Saturday May 17, 2025       William May 18, 2025       Monday May 19, 2025       Tuesday May 20, 2025       Wednesday May 21, 2025       Thursday May 22, 2025       Friday May 23, 2025       Saturday May 24, 2025       William May 25, 2025       Monday May 26, 2025       Tuesday May 27, 2025       Wednesday May 28, 2025       Thursday May 29, 2025       Friday May 30, 2025       Saturday May 31, 2025       William June 1, 2025       Monday June 2, 2025       Tuesday Radha 3, 2025        Wednesday June 4, 2025 Thursday June 5, 2025 Friday June 6, 2025 Saturday June 7, 2025 Sunday June 8, 2025            Eliecer Riley MD

## 2025-04-10 NOTE — ASSESSMENT & PLAN NOTE
Suggesting bicuspid aortic valve versus supravalvular stenosis.  Transthoracic echocardiogram for assessment.

## 2025-05-02 ENCOUNTER — TELEPHONE (OUTPATIENT)
Dept: CARDIOLOGY | Facility: CLINIC | Age: 25
End: 2025-05-02
Payer: COMMERCIAL

## 2025-05-04 ENCOUNTER — RESULTS FOLLOW-UP (OUTPATIENT)
Dept: CARDIOLOGY | Facility: CLINIC | Age: 25
End: 2025-05-04
Payer: COMMERCIAL

## 2025-05-04 DIAGNOSIS — I34.0 MITRAL VALVE INSUFFICIENCY, UNSPECIFIED ETIOLOGY: Primary | ICD-10-CM

## 2025-05-04 DIAGNOSIS — R93.1 ABNORMAL ECHOCARDIOGRAM: ICD-10-CM

## 2025-05-05 NOTE — TELEPHONE ENCOUNTER
Attempted to contact patient's mother, no answer, left voicemail requesting call back. Please put message through to Isabela at clinic.   Need to discuss new med ordered by PCP and echo results

## 2025-05-05 NOTE — TELEPHONE ENCOUNTER
Patient's mother Vesna returned phone call to office, discussed results of echo with Vesna, is agreeable to JENNIFER

## 2025-05-06 ENCOUNTER — TELEPHONE (OUTPATIENT)
Dept: CARDIOLOGY | Facility: CLINIC | Age: 25
End: 2025-05-06
Payer: COMMERCIAL

## 2025-05-06 NOTE — TELEPHONE ENCOUNTER
Attempted to contact patient's mother Vesna regarding JENNIFER, no answer left voicemail requesting call back. Please put call through to Isabela in clinic

## 2025-05-06 NOTE — TELEPHONE ENCOUNTER
LVM FOR PT TO CALL BACK AND RESCHEDULE FROM 7/10/25    [facility]UB CAN SCHEDULE EARLIEST AVAILABLE APPOINTMENT

## 2025-05-07 NOTE — TELEPHONE ENCOUNTER
Attempted to contact mother again, no answer, left voicemail requesting call back.     OK for HUB to relay- patient scheduled for JENNIFER at [facility] Thursday 5/8/2025, will need to arrive at 6:45am, check in at registration desk at front entrance,  nothing to eat or drink after midnight the night before. Can have a small sip of water to take morning medications.   Please relay to office

## 2025-05-09 ENCOUNTER — TELEPHONE (OUTPATIENT)
Dept: CARDIOLOGY | Facility: CLINIC | Age: 25
End: 2025-05-09
Payer: COMMERCIAL

## 2025-05-09 NOTE — TELEPHONE ENCOUNTER
Patient's mother walked in clinic today requesting to schedule JENNIFER (previously scheduled for 5/8/25- left voicemails for mother but was unable to reach and patient did go for JENNIFER- documented).   RN was made aware and also made aware that patient's mother was reporting that patient having symptoms of dyspnea when lying down, dizziness, fatigue. RN called Dr. Riley, notified, Dr. Riley advised to have patient go to [facility] ED for eval. RN discussed with patient's mother Vesna, verbalized understanding, reports she will take patient for eval

## 2025-05-12 ENCOUNTER — OUTSIDE FACILITY SERVICE (OUTPATIENT)
Dept: CARDIOLOGY | Facility: CLINIC | Age: 25
End: 2025-05-12
Payer: COMMERCIAL

## 2025-05-12 PROCEDURE — 99214 OFFICE O/P EST MOD 30 MIN: CPT | Performed by: INTERNAL MEDICINE

## 2025-05-12 PROCEDURE — 93325 DOPPLER ECHO COLOR FLOW MAPG: CPT | Performed by: INTERNAL MEDICINE

## 2025-05-12 PROCEDURE — 93312 ECHO TRANSESOPHAGEAL: CPT | Performed by: INTERNAL MEDICINE

## 2025-05-12 PROCEDURE — 93320 DOPPLER ECHO COMPLETE: CPT | Performed by: INTERNAL MEDICINE

## 2025-05-21 ENCOUNTER — TELEPHONE (OUTPATIENT)
Dept: CARDIOLOGY | Facility: CLINIC | Age: 25
End: 2025-05-21
Payer: COMMERCIAL

## 2025-05-21 NOTE — TELEPHONE ENCOUNTER
LVM FOR PT TO CALL BACK AND RESCHEDULE APPOINTMENT FROM 7/10/25     [facility] CAN SCHEDULE EARLIEST AVAILABLE APPOINTMENT

## 2025-05-23 ENCOUNTER — TELEPHONE (OUTPATIENT)
Dept: CARDIOLOGY | Facility: CLINIC | Age: 25
End: 2025-05-23
Payer: COMMERCIAL

## 2025-05-23 NOTE — TELEPHONE ENCOUNTER
LVM to call back. We typically do not have forms to request transportation for patients. Does she have that request? Is it something that the transportation company sent to us?     Closing this encounter please see other TE for today.

## 2025-05-23 NOTE — TELEPHONE ENCOUNTER
Caller: Vesna Capone    Relationship: Mother    Best call back number: [phone]    What is the medical concern/diagnosis: TRANSPORTATION REQUEST FOR GETTING PRE OP DONE    What specialty or service is being requested: TRANSPORTATION    What is the provider, practice or medical service name: [facility]    Any additional details: PATIENT IS NEEDING US TO FAX OVER THE REFERRAL TO GET THE TRANSPORT AS WE ARE THE PRIMARY CARDIOLOGIST PLEASE FAX IT TO THEM.

## 2025-05-23 NOTE — TELEPHONE ENCOUNTER
Patients mother called, said she needs a referral for transportation to patients appointments at [facility] on 05/27/2025 for pre-op and 05/28/2025 for surgery. Said she would like someone ot call her so she can better explain what she needs, [phone]

## 2025-05-23 NOTE — TELEPHONE ENCOUNTER
Patients mother brought forms by the office to be filled out for transportation to [facility] for pre op and surgery. Filled out, signed and faxed.

## 2025-05-23 NOTE — TELEPHONE ENCOUNTER
LVM to call back. We typically do not have forms to request transportation for patients. Does she have that request? Is it something that the transportation company sent to us?

## 2025-06-17 ENCOUNTER — CLINICAL SUPPORT (OUTPATIENT)
Dept: CARDIOLOGY | Facility: CLINIC | Age: 25
End: 2025-06-17
Payer: COMMERCIAL

## 2025-06-17 NOTE — PROGRESS NOTES
"Patient presented with foam tape on right flank area, mother reports this is covering \"wires\". Mother explained these \"wires\" were present on DC from [facility] after patient had heart surgery and a chest tube, reports that when chest tube was removed patient had bilateral lung collapse and these wires were inserted. Patient and mother both unsure of how to care for this or who to follow up with. Mother presented DC paperwork, no pulmonology follow up listed. RN discussed with Dr. Riley, at Dr. Riley direction RN advised patient and mother to go to emergency department for evaluation.   "

## 2025-07-17 ENCOUNTER — OFFICE VISIT (OUTPATIENT)
Dept: CARDIOLOGY | Facility: CLINIC | Age: 25
End: 2025-07-17
Payer: COMMERCIAL

## 2025-07-17 VITALS
HEIGHT: 76 IN | WEIGHT: 158 LBS | HEART RATE: 104 BPM | SYSTOLIC BLOOD PRESSURE: 112 MMHG | OXYGEN SATURATION: 98 % | BODY MASS INDEX: 19.24 KG/M2 | RESPIRATION RATE: 16 BRPM | DIASTOLIC BLOOD PRESSURE: 68 MMHG

## 2025-07-17 DIAGNOSIS — I77.810 AORTIC ROOT DILATATION: ICD-10-CM

## 2025-07-17 DIAGNOSIS — R29.91 MARFANOID HABITUS: ICD-10-CM

## 2025-07-17 DIAGNOSIS — Z98.890 S/P MVR (MITRAL VALVE REPAIR): Primary | ICD-10-CM

## 2025-07-17 PROBLEM — R01.1 HEART MURMUR: Status: RESOLVED | Noted: 2025-03-21 | Resolved: 2025-07-17

## 2025-07-17 NOTE — ASSESSMENT & PLAN NOTE
CT MR seen prior mitral valve repair with annuloplasty ring.  Echo after surgery reported with low ejection fraction 41% and trace mitral valve regurgitation.  Patient fatigued, poor appetite, poor exercise.  Exam suggestive of significant right-sided pleural effusion.  Plan:  Chest x-ray today and consider pulmonary referral if significant effusion  Repeat transthoracic echocardiogram for assessment of cardiac function and mitral valve status  Encourage patient to undergo cardiac rehab  Encourage hydration with protein shakes, fluid and frequent meals; follow-up in 1 week and consider low-dose beta-blockers if room in blood pressure/heart rate not improved

## 2025-07-17 NOTE — PROGRESS NOTES
[facility] [place]  [facility] CARDIOLOGY  [address] [place] [address]  Dept: [phone]  Dept Fax: [phone]    Date: 07/17/2025  Patient: Chiki Capone  YOB: 2000    Follow Up Office Visit Note    Interval Follow-up  Mr. Chiki Capone is a 25 y.o. male who is here for follow-up on Heart Murmur.    Subjective     History of Present Illness  The patient presents for evaluation of a cough, decreased appetite, and a stitch in his back.    A deep cough has been noted, causing concern. Physical activity is limited to essential movements such as getting out of bed, using the bathroom, and preparing meals. Despite encouragement to engage in light exercise like walking, he remains resistant. He has not undergone any recent CT scans. He reports no discomfort when lying flat and does not experience shortness of breath during sleep or exercise.    Appetite has decreased, leading to weight loss. Typically, he consumes two meals a day and sleeps extensively in the mornings. There is difficulty in getting him to eat, and he often relies on protein shakes.    Discomfort from a stitch on his back is reported, which becomes particularly bothersome during showers.  Patient denies angina, orthopnea, PND, palpitations, lightheadedness, syncope or medications side-effects.  Poor appetite, and significant decrease in exercise activities.    PAST SURGICAL HISTORY:  Collapsed lung    SOCIAL HISTORY    Exercise: No regular exercise; only moves to get out of bed, go to the bathroom, and kitchen.    The following portions of the patient's history were reviewed and updated as appropriate: allergies, current medications, past family history, past medical history, past social history, past surgical history, and problem list.    Medications:   Allergies   Allergen Reactions    Ibuprofen Nausea And Vomiting    Sulfa Antibiotics Rash    Trimethoprim Rash      Current Outpatient Medications  "  Medication Instructions    hydrOXYzine pamoate (VISTARIL) 50 MG capsule Take 2-3 capsules by mouth daily as needed for anxiety.    venlafaxine XR (EFFEXOR-XR) 75 mg, Daily    ziprasidone (GEODON) 40 mg, Every Night at Bedtime       Tobacco Use: High Risk (7/17/2025)    Patient History     Smoking Tobacco Use: Every Day     Smokeless Tobacco Use: Never     Passive Exposure: Not on file        Objective  Vitals:    07/17/25 1144   BP: 112/68   BP Location: Left arm   Patient Position: Sitting   Cuff Size: Adult   Pulse: 104   Resp: 16   SpO2: 98%   Weight: 71.7 kg (158 lb)   Height: 193 cm (75.98\")   PainSc: 4    PainLoc: Chest      Vitals:    07/17/25 1144   BP: 112/68   BP Location: Left arm   Patient Position: Sitting   Cuff Size: Adult   Pulse: 104   Resp: 16   SpO2: 98%   Weight: 71.7 kg (158 lb)   Height: 193 cm (75.98\")          Physical Exam  Constitutional:       Appearance: Healthy appearance. Not in distress.   Eyes:      Pupils: Pupils are equal, round, and reactive to light.   Neck:      Vascular: No JVR. JVD normal.   Pulmonary:      Effort: Pulmonary effort is normal.      Breath sounds: Examination of the right-middle field reveals decreased breath sounds. Examination of the right-lower field reveals decreased breath sounds. Decreased breath sounds present. No wheezing. No rhonchi. No rales.   Chest:      Chest wall: Not tender to palpatation.   Cardiovascular:      PMI at left midclavicular line. Normal rate. Regular rhythm. Normal S1 with normal intensity. Normal S2 with normal intensity.       Murmurs: There is no murmur.      No gallop.  No click. No rub.   Pulses:     Intact distal pulses.   Edema:     Peripheral edema absent.   Abdominal:      General: There is no abdominal bruit.   Skin:     General: Skin is warm.   Neurological:      Mental Status: Alert and oriented to person, place and time.              Diagnostic Data  Lab Results   Component Value Date     03/21/2025    K 4.3 " "03/21/2025    CL 98 03/21/2025    CO2 25 03/21/2025    BUN 13 03/21/2025    CREATININE 0.90 03/21/2025    CALCIUM 9.8 03/21/2025    BILITOT 0.7 03/21/2025    ALKPHOS 49 03/21/2025    ALT 5 03/21/2025    AST 13 03/21/2025    GLUCOSE 80 03/21/2025    ALBUMIN 5.1 03/21/2025     Lab Results   Component Value Date    WBC 5.4 03/21/2025    HGB 14.8 03/21/2025    HCT 44.2 03/21/2025     03/21/2025     Lab Results   Component Value Date    INR 0.9 05/27/2025    PTT 37 (H) 05/27/2025     Lab Results   Component Value Date    CKTOTAL 53 03/21/2025     No results found for: \"BNP\", \"PROBNP\"    Lab Results   Component Value Date    CHLPL 145 03/21/2025    TRIG 56 03/21/2025    HDL 50 03/21/2025    LDL 83 03/21/2025     Lab Results   Component Value Date    TSH 1.210 03/21/2025    FREET4 1.24 03/21/2025       CV Diagnostics:  Procedures  CXR: No results found for this or any previous visit.     ECHO/MUGA: Results for orders placed in visit on 05/02/25    Adult Transthoracic Echo Complete W/ Cont if Necessary Per Protocol    Interpretation Summary    Moderate LV dilatation with normal systolic function, estimated left ventricular EF = 61%. Left ventricular ejection fraction appears to be 61 - 65%.    The left atrial cavity is mildly dilated.    There are myxomatous changes of the mitral valve apparatus present. There is severe, holosystolic bileaflet mitral valve prolapse as well as mitral annular disjunction, associated with at least moderate regurgitation, eccentric jet.    Left ventricular diastolic function was normal.    Estimated right ventricular systolic pressure from tricuspid regurgitation is normal (<35 mmHg). Calculated right ventricular systolic pressure from tricuspid regurgitation is 32 mmHg.    The aortic root is mildly dilated to 3.7 cm.     STRESS TESTS: No results found for this or any previous visit.     CARDIAC CATH: No results found for this or any previous visit.     DEVICES: No valid procedures " specified.   HOLTER: No results found for this or any previous visit.     CT/MRI:  No results found for this or any previous visit.    VASCULAR: No valid procedures specified.     Assessment and Plan  Diagnoses and all orders for this visit:    1. S/P MVR (mitral valve repair) (Primary)  Assessment & Plan:  CT MR seen prior mitral valve repair with annuloplasty ring.  Echo after surgery reported with low ejection fraction 41% and trace mitral valve regurgitation.  Patient fatigued, poor appetite, poor exercise.  Exam suggestive of significant right-sided pleural effusion.  Plan:  Chest x-ray today and consider pulmonary referral if significant effusion  Repeat transthoracic echocardiogram for assessment of cardiac function and mitral valve status  Encourage patient to undergo cardiac rehab  Encourage hydration with protein shakes, fluid and frequent meals; follow-up in 1 week and consider low-dose beta-blockers if room in blood pressure/heart rate not improved    Orders:  -     XR Chest PA & Lateral; Future  -     Adult Transthoracic Echo Complete W/ Cont if Necessary Per Protocol; Future    2. Marfanoid habitus  Assessment & Plan:  Marfanoid features.  Valvular disease as well as borderline dilated ascending aorta.  Indeterminate Marfan score.  CTA chest abdomen with runoff negative for aortopathy.  Patient with poor appetite, somewhat tachycardic today.  Encouraged hydration, frequent meals, protein shakes and exercise.  Consider genetic testing next visit after recovery from mitral valve surgery.      3. Aortic root dilatation  Assessment & Plan:  On transthoracic echocardiogram.  Follow-up TTE every 3 years.  Consider low-dose beta-blocker.           Return in about 2 months (around 9/17/2025) for Follow-up with Dr Riley.    There are no Patient Instructions on file for this visit.    Patient or patient representative verbalized consent for the use of Ambient Listening during the visit with  Eliecer Riley MD  for chart documentation. 7/17/2025  13:08 EDT    Eliecer Riley MD

## 2025-07-17 NOTE — ASSESSMENT & PLAN NOTE
Marfanoid features.  Valvular disease as well as borderline dilated ascending aorta.  Indeterminate Marfan score.  CTA chest abdomen with runoff negative for aortopathy.  Patient with poor appetite, somewhat tachycardic today.  Encouraged hydration, frequent meals, protein shakes and exercise.  Consider genetic testing next visit after recovery from mitral valve surgery.

## 2025-07-22 ENCOUNTER — RESULTS FOLLOW-UP (OUTPATIENT)
Dept: CARDIOLOGY | Facility: CLINIC | Age: 25
End: 2025-07-22
Payer: COMMERCIAL

## 2025-07-24 NOTE — TELEPHONE ENCOUNTER
Attempted to contact patient's mother, no answer, left voicemail requesting callback.      Okay for HUB to relay-Please inform patient that his Chest XR was negative for acute cardiopulmonary process.

## 2025-08-13 ENCOUNTER — TELEPHONE (OUTPATIENT)
Dept: CARDIOLOGY | Facility: CLINIC | Age: 25
End: 2025-08-13
Payer: COMMERCIAL

## 2025-08-14 ENCOUNTER — CLINICAL SUPPORT (OUTPATIENT)
Dept: CARDIOLOGY | Facility: CLINIC | Age: 25
End: 2025-08-14
Payer: COMMERCIAL

## 2025-08-14 ENCOUNTER — TELEPHONE (OUTPATIENT)
Dept: CARDIOLOGY | Facility: CLINIC | Age: 25
End: 2025-08-14

## 2025-08-14 VITALS
WEIGHT: 158.6 LBS | SYSTOLIC BLOOD PRESSURE: 124 MMHG | BODY MASS INDEX: 19.31 KG/M2 | OXYGEN SATURATION: 97 % | DIASTOLIC BLOOD PRESSURE: 68 MMHG | HEART RATE: 127 BPM

## 2025-08-14 DIAGNOSIS — Z98.890 S/P MVR (MITRAL VALVE REPAIR): Primary | ICD-10-CM

## 2025-08-14 RX ORDER — METOPROLOL SUCCINATE 25 MG/1
TABLET, EXTENDED RELEASE ORAL
COMMUNITY
Start: 2025-08-08

## 2025-08-25 ENCOUNTER — TELEPHONE (OUTPATIENT)
Dept: CARDIOLOGY | Facility: CLINIC | Age: 25
End: 2025-08-25
Payer: COMMERCIAL

## 2025-08-26 RX ORDER — SACUBITRIL AND VALSARTAN 24; 26 MG/1; MG/1
1 TABLET ORAL DAILY
Qty: 30 TABLET | Refills: 2 | Status: SHIPPED | OUTPATIENT
Start: 2025-08-26